# Patient Record
Sex: MALE | Race: WHITE | NOT HISPANIC OR LATINO | URBAN - METROPOLITAN AREA
[De-identification: names, ages, dates, MRNs, and addresses within clinical notes are randomized per-mention and may not be internally consistent; named-entity substitution may affect disease eponyms.]

---

## 2018-01-17 ENCOUNTER — CONVERSION ENCOUNTER (OUTPATIENT)
Dept: NEUROLOGY | Facility: CLINIC | Age: 78
End: 2018-01-17

## 2018-01-17 ENCOUNTER — OFFICE VISIT CONVERTED (OUTPATIENT)
Dept: NEUROSURGERY | Facility: CLINIC | Age: 78
End: 2018-01-17
Attending: PHYSICIAN ASSISTANT

## 2018-01-30 ENCOUNTER — OFFICE VISIT CONVERTED (OUTPATIENT)
Dept: NEUROSURGERY | Facility: CLINIC | Age: 78
End: 2018-01-30
Attending: PHYSICIAN ASSISTANT

## 2018-03-30 ENCOUNTER — OFFICE VISIT CONVERTED (OUTPATIENT)
Dept: NEUROSURGERY | Facility: CLINIC | Age: 78
End: 2018-03-30
Attending: PHYSICIAN ASSISTANT

## 2018-09-26 ENCOUNTER — CONVERSION ENCOUNTER (OUTPATIENT)
Dept: NEUROLOGY | Facility: CLINIC | Age: 78
End: 2018-09-26

## 2018-09-26 ENCOUNTER — OFFICE VISIT CONVERTED (OUTPATIENT)
Dept: NEUROSURGERY | Facility: CLINIC | Age: 78
End: 2018-09-26
Attending: PHYSICIAN ASSISTANT

## 2021-02-08 ENCOUNTER — HOSPITAL ENCOUNTER (OUTPATIENT)
Dept: OTHER | Facility: HOSPITAL | Age: 81
Discharge: HOME | End: 2021-02-08
Attending: SPECIALIST

## 2021-02-08 ENCOUNTER — OFFICE VISIT CONVERTED (OUTPATIENT)
Dept: PULMONOLOGY | Facility: CLINIC | Age: 81
End: 2021-02-08
Attending: SPECIALIST

## 2021-04-20 ENCOUNTER — HOSPITAL ENCOUNTER (OUTPATIENT)
Dept: CARDIOLOGY | Facility: HOSPITAL | Age: 81
Discharge: HOME | End: 2021-04-20
Attending: SPECIALIST

## 2021-05-12 ENCOUNTER — OFFICE VISIT CONVERTED (OUTPATIENT)
Dept: PULMONOLOGY | Facility: CLINIC | Age: 81
End: 2021-05-12
Attending: SPECIALIST

## 2021-05-16 VITALS
DIASTOLIC BLOOD PRESSURE: 72 MMHG | HEART RATE: 98 BPM | WEIGHT: 309 LBS | BODY MASS INDEX: 45.77 KG/M2 | SYSTOLIC BLOOD PRESSURE: 134 MMHG | HEIGHT: 69 IN

## 2021-05-16 VITALS
DIASTOLIC BLOOD PRESSURE: 95 MMHG | SYSTOLIC BLOOD PRESSURE: 124 MMHG | WEIGHT: 315 LBS | HEIGHT: 69 IN | HEART RATE: 96 BPM | BODY MASS INDEX: 46.65 KG/M2

## 2021-05-16 VITALS
WEIGHT: 314 LBS | HEART RATE: 101 BPM | DIASTOLIC BLOOD PRESSURE: 87 MMHG | BODY MASS INDEX: 46.51 KG/M2 | HEIGHT: 69 IN | SYSTOLIC BLOOD PRESSURE: 134 MMHG

## 2021-05-16 VITALS
WEIGHT: 310 LBS | SYSTOLIC BLOOD PRESSURE: 145 MMHG | HEART RATE: 102 BPM | DIASTOLIC BLOOD PRESSURE: 108 MMHG | BODY MASS INDEX: 45.91 KG/M2 | HEIGHT: 69 IN

## 2021-05-28 VITALS
HEIGHT: 69 IN | RESPIRATION RATE: 18 BRPM | DIASTOLIC BLOOD PRESSURE: 61 MMHG | OXYGEN SATURATION: 100 % | TEMPERATURE: 97.9 F | RESPIRATION RATE: 16 BRPM | SYSTOLIC BLOOD PRESSURE: 136 MMHG | HEART RATE: 82 BPM | SYSTOLIC BLOOD PRESSURE: 132 MMHG | BODY MASS INDEX: 45.91 KG/M2 | WEIGHT: 310 LBS | TEMPERATURE: 97.7 F | HEIGHT: 69 IN | HEART RATE: 71 BPM | WEIGHT: 312 LBS | BODY MASS INDEX: 46.21 KG/M2 | DIASTOLIC BLOOD PRESSURE: 68 MMHG | OXYGEN SATURATION: 95 %

## 2021-05-28 NOTE — PROGRESS NOTES
Patient: AMRIT JANE     Acct: [id number]     Report: #[id number]  UNIT #: [id number]     : 1940    Encounter Date:2021  PRIMARY CARE: EM WASHBURN  ***Signed***  --------------------------------------------------------------------------------------------------------------------  Chief Complaint      Encounter Date      2021            Primary Care Provider            Em Ames            Referring Provider            Em Ames            Patient Complaint      Patient is complaining of      New pt here for cough, soa and dyspnea            VITALS      Height 5 ft 9 in / 175.26 cm      Weight 310 lbs 0 oz / 140.516006 kg      BSA 2.49 m2      BMI 45.8 kg/m2      Temperature 97.9 F / 36.61 C - Temporal      Pulse 82      Respirations 16      Blood Pressure 132/68 Sitting, Right Arm      Pulse Oximetry 100%, Room air      Initial Exhaled Nitrous Oxide      Date:  2021 (Was unable to do )            HPI      This is a 80-year-old very pleasant gentleman referred to this practice for     shortness of breath cough.  Patient had the coronary artery bypass graft surgery    done in 2018 and intermittently get the fluid in the lungs and gaining weight     for which the patient says the cardiology who recommended take the Bumex and     make him feel better.  He has been having the cough on and off unassociated with    any triggering factors.  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      Patient had a chest x-ray about couple of months ago and the report is not     available.  Patient also had a history of atrial fibrillation for which he is     taking the blood thinners.  Patient also had the following in the edition.      He had a sleep study done about 8 years ago initially tried on the CPAP and     subsequently the BiPAP and being followed by a different  physician and the     results are not available.      Used to smoke in the past but quit about 25 years ago.  Other multiple medical     problems are reviewed and as noted.      Other review the systems, past medical history and social history is done and as    documented below            ROS      Constitutional:  Denies: Fatigue, Fever, Weight gain, Weight loss, Chills,     Insomnia, Other      Respiratory/Breathing:  Complains of: Shortness of air, Cough; Denies: Wheezing,    Hemoptysis, Pleuritic pain, Other      Endocrine:  Denies: Polydipsia, Polyuria, Heat/cold intolerance, Diabetes, Other      Eyes:  Denies: Blurred vision, Vision Changes, Other      Ears, nose, mouth, throat:  Denies: Mouth lesions, Thrush, Throat pain,     Hoarseness, Allergies/Hay Fever, Post Nasal Drip, Headaches, Recent Head Injury,    Nose Bleeding, Neck Stiffness, Thyroid Mass, Hearing Loss, Ear Fullness, Dry     Mouth, Nasal or Sinus Pain, Dry Lips, Nasal discharge, Nasal congestion, Other      Cardiovascular:  Denies: Palpitations, Syncope, Claudication, Chest Pain, Wake     up Gasping for air, Leg Swelling, Irregular Heart Rate, Cyanosis, Dyspnea on     Exertion, Other      Gastrointestinal:  Denies: Nausea, Constipation, Diarrhea, Abdominal pain,     Vomiting, Difficulty Swallowing, Reflux/Heartburn, Dysphagia, Jaundice,     Bloating, Melena, Bloody stools, Other      Genitourinary:  Denies: Urinary frequency, Incontinence, Hematuria, Urgency,     Nocturia, Dysuria, Testicular problems, Other      Musculoskeletal:  Denies: Joint Pain, Joint Stiffness, Joint Swelling, Myalgias,    Other      Hematologic/lymphatic:  DENIES: Lymphadenopathy, Bruising, Bleeding tendencies,     Other      Neurological:  Denies: Headache, Numbness, Weakness, Seizures, Other      Psychiatric:  Denies: Anxiety, Appropriate Effect, Depression, Other      Sleep:  No: Excessive daytime sleep, Morning Headache?, Snoring, Insomnia?, Stop    breathing at sleep?,  Other      Integumentary:  Denies: Rash, Dry skin, Skin Warm to Touch, Other      Immunologic/Allergic:  Denies: Latex allergy, Seasonal allergies, Asthma, Urti    caria, Eczema, Other      Immunization status:  No: Up to date            FAMILY/SOCIAL/MEDICAL HX      Surgical History:  Yes: Bowel Surgery (COLONOSCOPY ), CABG, Cholecystectomy,     Head Surgery (LETY EYE SURGERY ), Orthopedic Surgery (LETY TKR, LEFT SHOULDER     SURGERY)      Heart - Family Hx:  Father      Diabetes - Family Hx:  Father, Brother, Sister      Cancer/Type - Family Hx:  Mother      Other Family Medical History:  Father, Brother      Is Father Still Living?:  No      Is Mother Still Living?:  No      Social History:  No Tobacco Use, No Alcohol Use, No Recreational Drug use      Smoking status:  Former smoker (20yo, 2 ppd, quit 1997)      Anticoagulation Therapy:  Yes      Antibiotic Prophylaxis:  No      Medical History:  Yes: Allergies, Diabetes (TYPE II ), Hemorrhoids/Rectal Prob     (ACID REFLUX ), High Blood Pressure (ON MEDICATION ); No: Blood Disease,     Chemotherapy/Cancer, Deafness or Ringing Ears, Shortness Of Breath, M    iscellaneous Medical/oth            PREVENTION      Hx Influenza Vaccination:  Yes      Date Influenza Vaccine Given:  Oct 1, 2020      Influenza Vaccine Declined:  No      2 or More Falls in Past Year?:  No      Fall Past Year with Injury?:  No      Hx Pneumococcal Vaccination:  Yes      Encouraged to follow-up with:  PCP regarding preventative exams.      Chart initiated by      Kerri Holliday MA            ALLERGIES/MEDICATIONS      Allergies:        Coded Allergies:             METFORMIN (Verified  Adverse Reaction, Mild, LEG PAIN, 2/8/21)      Medications    Last Reconciled on 2/9/21 08:14 by CINDY STRATTON      Magnesium Oxide (Magnesium Oxide*) 400 Mg Tablet      400 MG PO BID, #60 TAB 0 Refills         Reported         2/8/21       Allopurinol (Allopurinol) 300 Mg Tablet      300 MG PO QDAY, #30 TAB 0  Refills         Reported         2/8/21       Bumetanide (BUMETANIDE) 2 Mg Tablet      2 MG PO QDAY, #30 TAB         Reported         2/8/21       Potassium Chloride (Klor-Con*) 20 Meq Packet      20 MEQ PO QDAY, PKT         Reported         2/8/21       BIPAP Compressor (BIPAP) 1 Each Each      EACH XX ONCE, #1 0 Refills         Reported         2/8/21       Metoprolol Succinate (Metoprolol Succinate) 25 Mg Tab.er.24h      25 MG PO BID, #60 TAB 0 Refills         Reported         2/28/18       SITagliptin (Januvia) 100 Mg Tablet      100 MG PO QDAY, #30 TAB 0 Refills         Reported         2/28/18       Glimepiride (Glimepiride*) 2 Mg Tablet      2 MG PO QDAY, TAB         Reported         2/28/18       Rosuvastatin Calcium (Crestor*) 10 Mg Tablet      10 MG PO HS, #30 TAB 0 Refills         Reported         2/28/18       Apixaban (Eliquis) 5 Mg Tablet      5 MG PO BID for 30 Days, #60 TAB         Reported         2/28/18      Current Medications      Current Medications Reviewed 2/8/21            EXAM      Vtials      Vitals:             Height 5 ft 9 in / 175.26 cm           Weight 310 lbs 0 oz / 140.646180 kg           BSA 2.49 m2           BMI 45.8 kg/m2           Temperature 97.9 F / 36.61 C - Temporal           Pulse 82           Respirations 16           Blood Pressure 132/68 Sitting, Right Arm           Pulse Oximetry 100%, Room air            Constitutional      General appearance:  Chronically ill            Eyes      Conjunctiva:  Bilateral: Normal            ENMT      Nasal cavity:           Nostril:  Left: Septal perforation         Discharge:  Bilateral: None      Throat:  Other (Mallampati class III)            Respiratory      Work of breathing:  Normal      Auscultation:  Bilateral: Crackles/rales, Diminished at base            Cardiovascular      Rhythm:  irregularly irregular            Gastrointestinal      Abdomen description:  Obese      Hepatosplenomegaly:  none      Mass:  None             Extremity      Radial pulse:  Bilateral: Normal, Other (Edema of the both legs are 1-2+ pitting    and no clubbing and no cyanosis.)            Skin      General color:  Normal            Assessment      Shortness of breath - R06.02            Coronary artery abnormality - 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            Atrial fibrillation         Atrial fibrillation, unspecified type         Atrial fibrillation type: unspecified            Cough - R05            Deviated nasal septum - J34.2            Obstructive sleep apnea (adult) (pediatric) - G47.33            Essential hypertension - I10            Hyperlipidemia         Hyperlipidemia, unspecified hyperlipidemia type         Hyperlipidemia type: unspecified            Diabetes - E11.9         Diabetes mellitus type: type 2         Diabetes mellitus long term insulin use: unspecified long term insulin use        status            Allergies         Allergy, initial encounter         Encounter type: initial encounter            Notes      Patient's initial sleep study is not available but patient had a BiPAP titration    which was reported as 21 or 15 which controlled the respiratory events and     oxygenation.  Compliance is not available.  Patient stated that he uses     regularly.      Other medical problems are being well followed by primary team physician.      Based on the present clinical evaluation I feel that it is because of the     multiple other metabolic/medical causes causing his shortness of breath     including the cardiac problems, obesity obstructive sleep apnea and history of     atrial fibrillation etc.      Patient also has been having the cough and they are concerned about the previous    exposure at the workplace and has a pet in the house/dog.      To exclude any basic causes of pulmonary related, I took the liberty and order     for pulmonary function testing and 6-minute walk.  Along with that I  want to get    the overnight pulse oximetry on room air with the BiPAP.  We will get a chest x-    ray and patient is not able to do the Jerardo and will order the allergy 10.  I     will see him back again after the above testing done.      In the meantime I recommended the patient to check with the cardiology service     for adjustment of the medications if necessary and also follow-up with the     primary team physician for other causes.  To this effect I discussed with the     patient and his family and they expresses understanding      New Diagnostics      * PFT-Comp, PrePost,DLCO,BodyBox, Week         Dx: Shortness of breath - R06.02      * Overnight Oximetry         Dx: Shortness of breath - R06.02      * Chest 2 View, SCHEDULED PROCEDURE         Dx: Shortness of breath - R06.02      New Office Procedures      * 6 Min Walk With Pulse Ox, Routine         Dx: Shortness of breath - R06.02            Electronically signed by CINDY STRATTON  02/09/2021 08:15       Disclaimer: Converted document may not contain table formatting or lab diagrams. Please see [facility] for the authenticated document.

## 2021-05-28 NOTE — PROGRESS NOTES
Patient: AMRIT JANE     [id number]     Report: #[id number]  UNIT #: [id number]     : 1940    Encounter Date:2021  PRIMARY CARE: EM WASHBURN  ***Signed***  --------------------------------------------------------------------------------------------------------------------  Chief Complaint      Encounter Date      May 12, 2021            Primary Care Provider            Em Ames            Referring Provider            Em Ames            Patient Complaint      Patient is complaining of      Pt here for follow up, PFT, 6 min walk, overnight ox, CXR results            VITALS      Height 5 ft 9 in / 175.26 cm      Weight 312 lbs 0 oz / 141.032528 kg      BSA 2.50 m2      BMI 46.1 kg/m2      Temperature 97.7 F / 36.5 C - Temporal      Pulse 71      Respirations 18      Blood Pressure 136/61 Sitting, Left Arm      Pulse Oximetry 95%, room air      Initial Exhaled Nitrous Oxide      Date:  2021 (Was unable to do )            HPI      This is a 80-year-old very pleasant gentleman accompanied by his wife and     followed recently with us.  Patient has obstructive sleep apnea and at present     patient is on BiPAP and being followed by a sleep physician at her local town.      Patient also had a history of cardiomyopathy and follows with the cardiology and    also with the primary attending physician for other medical problems.      Patient had the overnight pulse oximetry on room air with the CPAP which did not    show any significant desaturations.  6-minute walk is within normal limits with     no significant desaturations.  Chest x-ray showing cardiomegaly otherwise     unremarkable.  Patient had the bilevel studies done on 2019 apparently     they do not have any initial studies and patient was on bilevel therapy with     24/10 with a pressure support of 6 and with heated humidifier and the patient     stated that he is using the CPAP regularly but we are not able to get any      download.  Pulmonary function testing done on April 20, 2021 showed FEV1 of     2.01, 73% predicted FVC of 2.77, 71% predicted with a ratio of 73%.  TLC is 71%     predicted and the diffusion is 80% predicted.  This suggestive of restrictive     lung defect and this could be secondary to body habitus and other cardiac     problems as well.  Other medical management, social history and review of the     systems and related is reviewed and as documented.            ROS      Constitutional:  Denies: Fatigue, Fever, Weight gain, Weight loss, Chills,     Insomnia, Other      Respiratory/Breathing:  Denies: Shortness of air, Wheezing, Cough, Hemoptysis,     Pleuritic pain, Other      Endocrine:  Complains of: Diabetes; Denies: Polydipsia, Polyuria, Heat/cold     intolerance, Other      Eyes:  Denies: Blurred vision, Vision Changes, Other      Ears, nose, mouth, throat:  Complains of: Nasal congestion; Denies: Mouth     lesions, Thrush, Throat pain, Hoarseness, Allergies/Hay Fever, Post Nasal Drip,     Headaches, Recent Head Injury, Nose Bleeding, Neck Stiffness, Thyroid Mass,     Hearing Loss, Ear Fullness, Dry Mouth, Nasal or Sinus Pain, Dry Lips, Nasal     discharge, Other      Cardiovascular:  Complains of: Dyspnea on Exertion; Denies: Palpitations,     Syncope, Claudication, Chest Pain, Wake up Gasping for air, Leg Swelling,     Irregular Heart Rate, Cyanosis, Other      Gastrointestinal:  Complains of: Reflux/Heartburn; Denies: Nausea, Constipation,    Diarrhea, Abdominal pain, Vomiting, Difficulty Swallowing, Dysphagia, Jaundice,     Bloating, Melena, Bloody stools, Other      Genitourinary:  Denies: Urinary frequency, Incontinence, Hematuria, Urgency,     Nocturia, Dysuria, Testicular problems, Other      Musculoskeletal:  Complains of: Joint Pain; Denies: Joint Stiffness, Joint S    welling, Myalgias, Other      Hematologic/lymphatic:  DENIES: Lymphadenopathy, Bruising, Bleeding tendencies,     Other       Neurological:  Denies: Headache, Numbness, Weakness, Seizures, Other      Psychiatric:  Denies: Anxiety, Appropriate Effect, Depression, Other      Sleep:  Yes: Other (sleep apnea/ Cpap); No: Excessive daytime sleep, Morning     Headache?, Snoring, Insomnia?, Stop breathing at sleep?      Integumentary:  Denies: Rash, Dry skin, Skin Warm to Touch, Other      Immunologic/Allergic:  Denies: Latex allergy, Seasonal allergies, Asthma,     Urticaria, Eczema, Other      Immunization status:  Up to date            FAMILY/SOCIAL/MEDICAL HX      Surgical History:  Yes: Bowel Surgery (COLONOSCOPY ), CABG, Cholecystectomy,     Head Surgery (LETY EYE SURGERY ), Orthopedic Surgery (LETY TKR, LEFT SHOULDER     SURGERY)      Heart - Family Hx:  Father      Diabetes - Family Hx:  Father, Brother, Sister      Cancer/Type - Family Hx:  Mother      Other Family Medical History:  Father, Brother      Is Father Still Living?:  No      Is Mother Still Living?:  No      Social History:  No Tobacco Use, No Alcohol Use, No Recreational Drug use      Smoking status:  Former smoker (22yo, 2 ppd, quit 1997)      Anticoagulation Therapy:  Yes      Antibiotic Prophylaxis:  No      Medical History:  Yes: Allergies, Diabetes (TYPE II ), Hemorrhoids/Rectal Prob     (ACID REFLUX ), High Blood Pressure (ON MEDICATION ); No: Blood Disease,     Chemotherapy/Cancer, Deafness or Ringing Ears, Shortness Of Breath, Sinus     Trouble, Miscellaneous Medical/oth      Psychiatric History      None            PREVENTION      Hx Influenza Vaccination:  Yes      Date Influenza Vaccine Given:  Oct 1, 2020      Influenza Vaccine Declined:  No      2 or More Falls in Past Year?:  No      Fall Past Year with Injury?:  No      Hx Pneumococcal Vaccination:  Yes      Encouraged to follow-up with:  PCP regarding preventative exams.      Chart initiated by      Ember Harris MA            ALLERGIES/MEDICATIONS      Allergies:        Coded Allergies:             METFORMIN  (Verified  Adverse Reaction, Mild, LEG PAIN, 5/12/21)      Medications    Last Reconciled on 5/12/21 14:24 by CINDY STRATTON      Magnesium Oxide (Magnesium Oxide*) 400 Mg Tablet      400 MG PO BID, #60 TAB 0 Refills         Reported         2/8/21       Allopurinol (Allopurinol) 300 Mg Tablet      300 MG PO QDAY, #30 TAB 0 Refills         Reported         2/8/21       Bumetanide (BUMETANIDE) 2 Mg Tablet      2 MG PO QDAY, #30 TAB         Reported         2/8/21       Potassium Chloride (Klor-Con*) 20 Meq Packet      20 MEQ PO QDAY, PKT         Reported         2/8/21       BIPAP Compressor (BIPAP) 1 Each Each      EACH XX ONCE, #1 0 Refills         Reported         2/8/21       Metoprolol Succinate (Metoprolol Succinate) 25 Mg Tab.er.24h      25 MG PO BID, #60 TAB 0 Refills         Reported         2/28/18       SITagliptin (Januvia) 100 Mg Tablet      100 MG PO QDAY, #30 TAB 0 Refills         Reported         2/28/18       Glimepiride (Glimepiride*) 2 Mg Tablet      2 MG PO QDAY, TAB         Reported         2/28/18       Rosuvastatin Calcium (Crestor*) 10 Mg Tablet      10 MG PO HS, #30 TAB 0 Refills         Reported         2/28/18       Apixaban (Eliquis) 5 Mg Tablet      5 MG PO BID for 30 Days, #60 TAB         Reported         2/28/18      Current Medications      Current Medications Reviewed 5/12/21            EXAM      Very pleasant gentleman alert and oriented and somewhat hard of hearing     otherwise walks with a walker and comfortable at rest.  Accompanied by his wife.      Vtials      Vitals:             Height 5 ft 9 in / 175.26 cm           Weight 312 lbs 0 oz / 141.379549 kg           BSA 2.50 m2           BMI 46.1 kg/m2           Temperature 97.7 F / 36.5 C - Temporal           Pulse 71           Respirations 18           Blood Pressure 136/61 Sitting, Left Arm           Pulse Oximetry 95%, room air            Eyes      Conjunctiva:  Bilateral: Normal            ENMT      Throat:  Normal             Neck      Enlarged nodes:  Bilateral: None      Thyroid - size:  Normal            Respiratory      Work of breathing:  Normal      Auscultation:  Bilateral: Crackles/rales, Diminished at base            Cardiovascular      Rhythm:  regular      Heart sounds:  NORMAL: S1, S2            Gastrointestinal      Abdomen description:  Normal (Obese.  Soft.  Nontender.  Bowel sounds present)            Extremity      Radial pulse:  Bilateral: Normal, Other (No clubbing.  No cyanosis.  Patient is     wearing the CASSIDY hose)            Skin      General color:  Normal            Assessment      Cardiomyopathy         Cardiomyopathy, unspecified type         Cardiomyopathy type: unspecified            Obstructive sleep apnea (adult) (pediatric) - G47.33            Morbid obesity with BMI of 45.0-49.9, adult - E66.01, Z68.42            Restrictive lung disease - J98.4            Notes      Patient with the present work-up is reviewed and as noted above and discussed     with the patient.  Patient's restrictive lung defect and the pulmonary function     testing could be multiple factorial including the cardiomyopathy, obesity and     obstructive sleep apnea and other related.  No significant evidence of     obstructive airway disease are any reversible airway component.  No medications     was given in addition at this time.  I discussed with them about the different     options.  Patient to follow-up with the sleep specialist at his hometown along     with his cardiologist and primary team physician.  If any further assistance is     needed recommend the patient to be seen by us.            Electronically signed by CINDY STRATTON  05/12/2021 14:25       Disclaimer: Converted document may not contain table formatting or lab diagrams. Please see [facility] for the authenticated document.

## 2025-06-06 ENCOUNTER — TELEPHONE (OUTPATIENT)
Dept: UROLOGY | Age: 85
End: 2025-06-06
Payer: MEDICARE

## 2025-06-06 NOTE — TELEPHONE ENCOUNTER
CALLED PT. RE: PRE JOVANNA. PT. SCHED. FOR NP APPT. THA/ [address] 06/10.  LFT. MSG. FOR PT. TO CALL BACK PRIOR TO APPT.  RMB

## 2025-06-10 ENCOUNTER — OFFICE VISIT (OUTPATIENT)
Dept: UROLOGY | Age: 85
End: 2025-06-10
Payer: MEDICARE

## 2025-06-10 VITALS — WEIGHT: 312 LBS | RESPIRATION RATE: 16 BRPM | BODY MASS INDEX: 46.21 KG/M2 | HEIGHT: 69 IN

## 2025-06-10 DIAGNOSIS — N20.0 RENAL STONES: ICD-10-CM

## 2025-06-10 DIAGNOSIS — R31.9 HEMATURIA, UNSPECIFIED TYPE: Primary | ICD-10-CM

## 2025-06-10 DIAGNOSIS — R10.9 FLANK PAIN: ICD-10-CM

## 2025-06-10 PROBLEM — E88.2 EPIDURAL LIPOMATOSIS: Status: ACTIVE | Noted: 2018-03-30

## 2025-06-10 PROBLEM — R35.1 NOCTURIA: Status: ACTIVE | Noted: 2019-06-05

## 2025-06-10 PROBLEM — K21.9 GASTROESOPHAGEAL REFLUX DISEASE: Status: ACTIVE | Noted: 2023-02-01

## 2025-06-10 PROBLEM — F32.A DEPRESSIVE DISORDER: Status: ACTIVE | Noted: 2019-01-31

## 2025-06-10 PROBLEM — G47.33 OBSTRUCTIVE SLEEP APNEA SYNDROME: Status: ACTIVE | Noted: 2022-06-18

## 2025-06-10 PROBLEM — M54.16 LUMBAR RADICULOPATHY: Status: ACTIVE | Noted: 2023-03-21

## 2025-06-10 PROBLEM — E11.9 DM (DIABETES MELLITUS): Chronic | Status: ACTIVE | Noted: 2023-02-01

## 2025-06-10 PROBLEM — E87.6 HYPOKALEMIA: Status: ACTIVE | Noted: 2021-06-17

## 2025-06-10 PROBLEM — I27.21 PULMONARY ARTERIAL HYPERTENSION: Chronic | Status: ACTIVE | Noted: 2018-03-13

## 2025-06-10 PROBLEM — M54.12 CERVICAL RADICULAR PAIN: Status: ACTIVE | Noted: 2020-10-05

## 2025-06-10 PROBLEM — M54.30 SCIATICA: Status: ACTIVE | Noted: 2018-01-30

## 2025-06-10 PROBLEM — N28.9 RENAL INSUFFICIENCY: Status: ACTIVE | Noted: 2019-04-29

## 2025-06-10 PROBLEM — R26.81 UNSTEADY GAIT WHEN WALKING: Status: ACTIVE | Noted: 2018-03-30

## 2025-06-10 PROBLEM — M54.50 LOW BACK PAIN: Status: ACTIVE | Noted: 2025-06-10

## 2025-06-10 PROBLEM — R06.00 DYSPNEA: Status: ACTIVE | Noted: 2018-12-17

## 2025-06-10 PROBLEM — I25.10 ARTERIOSCLEROSIS OF CORONARY ARTERY: Status: ACTIVE | Noted: 2019-01-28

## 2025-06-10 PROBLEM — R53.1 ASTHENIA: Status: ACTIVE | Noted: 2019-12-05

## 2025-06-10 PROBLEM — I48.91 AFIB: Chronic | Status: ACTIVE | Noted: 2023-02-01

## 2025-06-10 PROBLEM — I50.9 CONGESTIVE HEART FAILURE: Chronic | Status: ACTIVE | Noted: 2019-01-31

## 2025-06-10 PROBLEM — D17.79 EPIDURAL LIPOMATOSIS: Status: ACTIVE | Noted: 2018-03-30

## 2025-06-10 PROBLEM — G43.909 MIGRAINE: Status: ACTIVE | Noted: 2023-02-01

## 2025-06-10 PROBLEM — J30.9 ALLERGIC RHINITIS: Status: ACTIVE | Noted: 2019-01-28

## 2025-06-10 PROBLEM — M51.369 DEGENERATION OF LUMBAR INTERVERTEBRAL DISC: Status: ACTIVE | Noted: 2018-01-30

## 2025-06-10 PROBLEM — D51.9 VITAMIN B12 DEFICIENCY ANEMIA, UNSPECIFIED: Status: ACTIVE | Noted: 2020-12-11

## 2025-06-10 PROBLEM — R42 DIZZINESS: Status: ACTIVE | Noted: 2020-10-31

## 2025-06-10 PROBLEM — J90 PLEURAL EFFUSION: Status: ACTIVE | Noted: 2019-04-02

## 2025-06-10 PROBLEM — M25.50 ARTHRALGIA: Status: ACTIVE | Noted: 2025-06-10

## 2025-06-10 LAB
BILIRUB BLD-MCNC: NEGATIVE MG/DL
CLARITY, POC: CLEAR
COLOR UR: YELLOW
EXPIRATION DATE: ABNORMAL
GLUCOSE UR STRIP-MCNC: NEGATIVE MG/DL
KETONES UR QL: NEGATIVE
LEUKOCYTE EST, POC: NEGATIVE
Lab: ABNORMAL
NITRITE UR-MCNC: NEGATIVE MG/ML
PH UR: 7 [PH] (ref 5–8)
PROT UR STRIP-MCNC: ABNORMAL MG/DL
RBC # UR STRIP: NEGATIVE /UL
SP GR UR: 1.02 (ref 1–1.03)
UROBILINOGEN UR QL: NORMAL

## 2025-06-10 PROCEDURE — 81003 URINALYSIS AUTO W/O SCOPE: CPT | Performed by: NURSE PRACTITIONER

## 2025-06-10 PROCEDURE — 99214 OFFICE O/P EST MOD 30 MIN: CPT | Performed by: NURSE PRACTITIONER

## 2025-06-10 PROCEDURE — 1159F MED LIST DOCD IN RCRD: CPT | Performed by: NURSE PRACTITIONER

## 2025-06-10 PROCEDURE — 1160F RVW MEDS BY RX/DR IN RCRD: CPT | Performed by: NURSE PRACTITIONER

## 2025-06-10 RX ORDER — ALLOPURINOL 300 MG/1
300 TABLET ORAL DAILY
COMMUNITY
Start: 2025-03-19

## 2025-06-10 RX ORDER — METOPROLOL SUCCINATE 50 MG/1
50 TABLET, EXTENDED RELEASE ORAL DAILY
COMMUNITY
Start: 2025-03-19

## 2025-06-10 RX ORDER — INSULIN LISPRO 200 [IU]/ML
INJECTION, SOLUTION SUBCUTANEOUS
COMMUNITY
Start: 2025-04-07

## 2025-06-10 RX ORDER — INSULIN GLARGINE 100 [IU]/ML
INJECTION, SOLUTION SUBCUTANEOUS
COMMUNITY
Start: 2025-05-26

## 2025-06-10 RX ORDER — BUMETANIDE 2 MG/1
2 TABLET ORAL DAILY
COMMUNITY
Start: 2025-02-24

## 2025-06-10 RX ORDER — GABAPENTIN 600 MG/1
600 TABLET ORAL 3 TIMES DAILY
COMMUNITY
Start: 2025-05-02

## 2025-06-10 RX ORDER — CYCLOBENZAPRINE HCL 10 MG
10 TABLET ORAL
COMMUNITY
Start: 2025-06-04 | End: 2025-07-14

## 2025-06-10 RX ORDER — POTASSIUM CHLORIDE 1500 MG/1
TABLET, EXTENDED RELEASE ORAL
COMMUNITY
Start: 2025-04-14

## 2025-06-10 RX ORDER — ERGOCALCIFEROL 1.25 MG/1
CAPSULE, LIQUID FILLED ORAL
COMMUNITY
Start: 2025-04-28

## 2025-06-10 NOTE — PROGRESS NOTES
Chief Complaint: Blood in Urine and Establish Care    Subjective         History of Present Illness  Michael Corado is a 85 y.o. male presents to [facility] to be seen for hematuria.    The patient states that for the last 3 months he has been having pain in the left flank.     He has never seen blood in the urine.     Patient was seen by his PCP 3/17/2015 with complaints of aching across shoulders neck upper back and left lower back for several days.    A urinalysis was performed at that date of service which was a dipstick only office that revealed large blood however this was not confirmed by urine microscopy.    Also found to have group B strep 1000 colonies per milliliter no susceptibility report was sent.    Had a CT scan of the abdomen and pelvis without contrast performed on that same date of service for indications of left flank pain.  Revealed a small left pleural effusion.  Pneumobilia without significant biliary ductal dilatation.  There were also noted to have bilateral nonobstructing renal calculi in each of his kidneys.  No size mentioned of the stone.  Not noted to have ureteral dilatation or ureterolithiasis.    He was treated with cefdinir by his PCP for his positive ua in office.     Walking tends to aggravate pain.     Urinalysis with microscopy from 3/25/2025 revealed no red blood cells.    Did follow-up with his primary care provider on/4/25 patient had still mentioned had low back pain that had worsened over the last month.    Noted to have tenderness and spasm present in the left mid to lower back.    He was given Flexeril and a prednisone injection and had a urinalysis performed once again which was negative for any abnormalities.    He has had no relief of this pain in his left flank.     Previous hx of renal stones requiring surgery before in the 80s.          Objective     Past Medical History:   Diagnosis Date    Kidney stones        Past Surgical History:  "  Procedure Laterality Date    COMMON BILE DUCT EXPLORATION      CORONARY ARTERY BYPASS GRAFT  12/27/2018    PACEMAKER IMPLANTATION  07/28/2023         Current Outpatient Medications:     allopurinol (ZYLOPRIM) 300 MG tablet, Take 1 tablet by mouth Daily., Disp: , Rfl:     apixaban (ELIQUIS) 5 MG tablet tablet, Take 1 tablet by mouth 2 (Two) Times a Day., Disp: , Rfl:     bumetanide (BUMEX) 2 MG tablet, Take 1 tablet by mouth Daily., Disp: , Rfl:     cyclobenzaprine (FLEXERIL) 10 MG tablet, Take 1 tablet by mouth., Disp: , Rfl:     gabapentin (NEURONTIN) 600 MG tablet, Take 1 tablet by mouth 3 (Three) Times a Day., Disp: , Rfl:     HumaLOG KwikPen 200 UNIT/ML solution pen-injector, , Disp: , Rfl:     Lantus SoloStar 100 UNIT/ML injection pen, , Disp: , Rfl:     metoprolol succinate XL (TOPROL-XL) 50 MG 24 hr tablet, Take 1 tablet by mouth Daily., Disp: , Rfl:     potassium chloride ER (K-TAB) 20 MEQ tablet controlled-release ER tablet, TAKE 1 TABLET BY MOUTH DAILY ON DAYS TAKING AN EXTRA BUMETANIDE FOR WEIGHT GAIN, Disp: , Rfl:     vitamin D (ERGOCALCIFEROL) 1.25 MG (24856 UT) capsule capsule, , Disp: , Rfl:     Allergies   Allergen Reactions    Metformin Myalgia     Other reaction(s): Allergy testing    Nickel Other (See Comments)     Other reaction(s): Allergy testing   Other reaction(s): Allergy testing    Other reaction(s): Allergy testing   Other reaction(s): Allergy testing   Other reaction(s): Allergy testing    Neomycin-Bacitracin Zn-Polymyx Rash        History reviewed. No pertinent family history.    Social History     Socioeconomic History    Marital status:    Tobacco Use    Smoking status: Former     Types: Cigarettes     Passive exposure: Never    Smokeless tobacco: Never   Vaping Use    Vaping status: Former   Substance and Sexual Activity    Alcohol use: Not Currently    Drug use: Never    Sexual activity: Defer       Vital Signs:   Resp 16   Ht 175.3 cm (69\")   Wt (!) 142 kg (312 lb)   BMI " 46.07 kg/m²      Physical Exam     Result Review :   The following data was reviewed by: JOANNE Oliveira on 06/10/2025:  Results for orders placed or performed in visit on 06/10/25   POC Urinalysis Dipstick, Automated    Collection Time: 06/10/25 10:40 AM    Specimen: Urine   Result Value Ref Range    Color Yellow Yellow, Straw, Dark Yellow, Digna    Clarity, UA Clear Clear    Specific Gravity  1.020 1.005 - 1.030    pH, Urine 7.0 5.0 - 8.0    Leukocytes Negative Negative    Nitrite, UA Negative Negative    Protein, POC Trace (A) Negative mg/dL    Glucose, UA Negative Negative mg/dL    Ketones, UA Negative Negative    Urobilinogen, UA Normal Normal, 0.2 E.U./dL    Bilirubin Negative Negative    Blood, UA Negative Negative    Lot Number [id number]     Expiration Date 32,026             Procedures        Assessment and Plan    Diagnoses and all orders for this visit:    1. Hematuria, unspecified type (Primary)  -     POC Urinalysis Dipstick, Automated  -     CT Abdomen Pelvis With & Without Contrast; Future    2. Flank pain  -     CT Abdomen Pelvis With & Without Contrast; Future    3. Renal stones  -     CT Abdomen Pelvis With & Without Contrast; Future        I discussed with the patient at this point in time I do not believe that his flank pain is related to intrarenal stones however there was no size noted on the stones or location noted on the stones and I am unable to access his CT scan images at time of visit.    I do believe his pain is more than likely musculoskeletal in nature and will defer treatment to his primary care/pain management team.    Will order CT of the abdomen and pelvis without contrast with delayed imaging to evaluate size and location of stones as well as for possible hydronephrosis.    Did discuss with patient that it does not appear that he had actual microscopic hematuria as this was only noted on the dipstick test and ultimately had resolved by the time UA micro had been performed  once again.    His microscopic hematuria could have also been related to lower urinary tract infection however he had a very low colony count of GBS unsure if this was contributing or not.      I spent 15 minutes caring for Michael on this date of service. This time includes time spent by me in the following activities:reviewing tests, obtaining and/or reviewing a separately obtained history, performing a medically appropriate examination and/or evaluation , counseling and educating the patient/family/caregiver, ordering medications, tests, or procedures, and documenting information in the medical record  Follow Up   Return in about 3 months (around 9/10/2025) for f/u [place] office.  Patient was given instructions and counseling regarding his condition or for health maintenance advice. Please see specific information pulled into the AVS if appropriate.         This document has been electronically signed by JOANNE Oliveira  Sakina 10, 2025 11:21 EDT

## 2025-06-18 ENCOUNTER — HOSPITAL ENCOUNTER (OUTPATIENT)
Dept: CT IMAGING | Facility: HOSPITAL | Age: 85
Discharge: HOME | End: 2025-06-18
Admitting: NURSE PRACTITIONER
Payer: MEDICARE

## 2025-06-18 DIAGNOSIS — R10.9 FLANK PAIN: ICD-10-CM

## 2025-06-18 DIAGNOSIS — N20.0 RENAL STONES: ICD-10-CM

## 2025-06-18 DIAGNOSIS — R31.9 HEMATURIA, UNSPECIFIED TYPE: ICD-10-CM

## 2025-06-18 LAB
CREAT BLDA-MCNC: 1 MG/DL (ref 0.6–1.3)
EGFRCR SERPLBLD CKD-EPI 2021: 74.2 ML/MIN/1.73

## 2025-06-18 PROCEDURE — 82565 ASSAY OF CREATININE: CPT

## 2025-06-18 PROCEDURE — 74178 CT ABD&PLV WO CNTR FLWD CNTR: CPT

## 2025-06-18 PROCEDURE — 25510000001 IOPAMIDOL PER 1 ML: Performed by: NURSE PRACTITIONER

## 2025-06-18 RX ORDER — IOPAMIDOL 755 MG/ML
100 INJECTION, SOLUTION INTRAVASCULAR
Status: COMPLETED | OUTPATIENT
Start: 2025-06-18 | End: 2025-06-18

## 2025-06-18 RX ADMIN — IOPAMIDOL 100 ML: 755 INJECTION, SOLUTION INTRAVENOUS at 14:16

## 2025-06-23 ENCOUNTER — RESULTS FOLLOW-UP (OUTPATIENT)
Dept: UROLOGY | Age: 85
End: 2025-06-23
Payer: MEDICARE

## 2025-06-23 NOTE — PROGRESS NOTES
Please let patient know that his CT scan does not show any urologic cause of the pain he is having at this time.  He does have several bulging disks in his back which could be contributing to the pain he is having.  Would recommend that he follow-up with his primary care in regards to this.